# Patient Record
Sex: MALE | Race: BLACK OR AFRICAN AMERICAN | NOT HISPANIC OR LATINO | Employment: FULL TIME | URBAN - METROPOLITAN AREA
[De-identification: names, ages, dates, MRNs, and addresses within clinical notes are randomized per-mention and may not be internally consistent; named-entity substitution may affect disease eponyms.]

---

## 2021-04-14 ENCOUNTER — OFFICE VISIT (OUTPATIENT)
Dept: NURSING | Facility: CLINIC | Age: 46
End: 2021-04-14
Payer: COMMERCIAL

## 2021-04-14 PROCEDURE — 91300 PR COVID VAC PFIZER DIL RECON 30 MCG/0.3 ML IM: CPT

## 2021-04-14 PROCEDURE — 0001A PR COVID VAC PFIZER DIL RECON 30 MCG/0.3 ML IM: CPT

## 2021-05-05 ENCOUNTER — IMMUNIZATION (OUTPATIENT)
Dept: NURSING | Facility: CLINIC | Age: 46
End: 2021-05-05
Attending: INTERNAL MEDICINE
Payer: COMMERCIAL

## 2021-05-05 PROCEDURE — 91300 PR COVID VAC PFIZER DIL RECON 30 MCG/0.3 ML IM: CPT

## 2021-05-05 PROCEDURE — 0002A PR COVID VAC PFIZER DIL RECON 30 MCG/0.3 ML IM: CPT

## 2021-05-30 ENCOUNTER — HEALTH MAINTENANCE LETTER (OUTPATIENT)
Age: 46
End: 2021-05-30

## 2021-09-19 ENCOUNTER — HEALTH MAINTENANCE LETTER (OUTPATIENT)
Age: 46
End: 2021-09-19

## 2022-06-26 ENCOUNTER — HEALTH MAINTENANCE LETTER (OUTPATIENT)
Age: 47
End: 2022-06-26

## 2022-11-21 ENCOUNTER — HEALTH MAINTENANCE LETTER (OUTPATIENT)
Age: 47
End: 2022-11-21

## 2023-07-09 ENCOUNTER — HEALTH MAINTENANCE LETTER (OUTPATIENT)
Age: 48
End: 2023-07-09

## 2024-03-07 ENCOUNTER — OFFICE VISIT (OUTPATIENT)
Dept: FAMILY MEDICINE | Facility: CLINIC | Age: 49
End: 2024-03-07
Payer: COMMERCIAL

## 2024-03-07 VITALS
BODY MASS INDEX: 31.34 KG/M2 | DIASTOLIC BLOOD PRESSURE: 80 MMHG | TEMPERATURE: 98.2 F | HEIGHT: 66 IN | RESPIRATION RATE: 18 BRPM | WEIGHT: 195 LBS | SYSTOLIC BLOOD PRESSURE: 138 MMHG | HEART RATE: 96 BPM | OXYGEN SATURATION: 97 %

## 2024-03-07 DIAGNOSIS — Z23 NEED FOR IMMUNIZATION AGAINST TYPHOID: ICD-10-CM

## 2024-03-07 DIAGNOSIS — Z23 NEED FOR HEPATITIS A IMMUNIZATION: ICD-10-CM

## 2024-03-07 DIAGNOSIS — Z71.84 ENCOUNTER FOR COUNSELING FOR TRAVEL: Primary | ICD-10-CM

## 2024-03-07 PROCEDURE — 90472 IMMUNIZATION ADMIN EACH ADD: CPT | Mod: GA | Performed by: PHYSICIAN ASSISTANT

## 2024-03-07 PROCEDURE — 99402 PREV MED CNSL INDIV APPRX 30: CPT | Mod: 25 | Performed by: PHYSICIAN ASSISTANT

## 2024-03-07 PROCEDURE — 90471 IMMUNIZATION ADMIN: CPT | Mod: GA | Performed by: PHYSICIAN ASSISTANT

## 2024-03-07 PROCEDURE — 90632 HEPA VACCINE ADULT IM: CPT | Mod: GA | Performed by: PHYSICIAN ASSISTANT

## 2024-03-07 PROCEDURE — 90691 TYPHOID VACCINE IM: CPT | Mod: GA | Performed by: PHYSICIAN ASSISTANT

## 2024-03-07 RX ORDER — ATOVAQUONE AND PROGUANIL HYDROCHLORIDE 250; 100 MG/1; MG/1
1 TABLET, FILM COATED ORAL DAILY
Qty: 30 TABLET | Refills: 0 | Status: SHIPPED | OUTPATIENT
Start: 2024-03-07

## 2024-03-07 RX ORDER — AMLODIPINE BESYLATE 10 MG/1
10 TABLET ORAL DAILY
COMMUNITY
Start: 2023-01-16 | End: 2024-03-07

## 2024-03-07 RX ORDER — AZITHROMYCIN 500 MG/1
TABLET, FILM COATED ORAL
Qty: 3 TABLET | Refills: 0 | Status: SHIPPED | OUTPATIENT
Start: 2024-03-07

## 2024-03-07 RX ORDER — LOPERAMIDE HYDROCHLORIDE 2 MG/1
2 TABLET ORAL 4 TIMES DAILY PRN
Qty: 40 TABLET | Refills: 0 | Status: SHIPPED | OUTPATIENT
Start: 2024-03-07

## 2024-03-07 RX ORDER — AMLODIPINE BESYLATE 10 MG/1
10 TABLET ORAL DAILY
Qty: 90 TABLET | Refills: 1 | Status: SHIPPED | OUTPATIENT
Start: 2024-03-07

## 2024-03-07 NOTE — PROGRESS NOTES
SUBJECTIVE: Carlton Mccord , a 48 year old  male, presents for counseling and information regarding upcoming travel to [facility]. Special medical concerns include: none. He anticipates the following unusual exposures: none.    Itinerary: [facility]    Departure Date: 3/13/2024 Return date: 4/2/2024    Reason for travel (i.e. Business, pleasure): family emergency    Visiting an urban or rural area?: urban    Accommodations (i.e. hotel, hostel, friends, family, etc): family    Women - First day of your last period: NA    IMMUNIZATION HISTORY  Have you received any vaccinations in the past 4 weeks?  No  Have you ever fainted from having your blood drawn or from an injection?  No  Have you ever had a fever reaction to vaccination?  No  Have you ever had any bad reaction or side effect from any vaccination?  No  Have you ever had hepatitis A or B vaccine?  No  Do you live (or work closely) with anyone who has AIDS, an AIDS-like condition, any other immune disorder or who is on chemotherapy for cancer?  No  Have you received any injection of immune globulin or any blood products during the past 12 months?  No    GENERAL MEDICAL HISTORY  Do you have a medical condition that warrants maintenance medication or physician follow-up?  No  Do you have a medical condition that is stable now, but that may recur while traveling?  No  Has your spleen been removed?  No  Have you had an acute illness or a fever in the past 48 hours?  No  Are you pregnant, or might you become pregnant on this trip?  Any chance of pregnancy?  No  Are you breastfeeding?  No  Do you have HIV, AIDS, an AIDS-like condition, any other immune disorder, leukemia or cancer?  No  Do you have a severe combined immunodeficiency disease?  No  Have you had your thymus gland removed or history of problems with your thymus, such as myasthenia gravis, DiGeorge syndrome, or thymoma?  No    Do you have severe thrombocytopenia (low platelet count) or a coagulation  disorder?  No  Have you ever had a convulsion, seizure, epilepsy, neurologic condition or brain infection?  No  Do you have any stomach conditions?  No  Do you have a G6PD deficiency?  No  Do you have severe renal or kidney impairment?  No  Do you have a history of psychiatric problems?  No  Do you have a problem with strange dreams and/or nightmares?  No  Do you have insomnia?  No  Do you have problems with vaginitis?  No  Do you have psoriasis?  No  Are you prone to motion sickness?  No  Have you ever had headaches, nausea, vomiting, or breathing problems from altitude exposure?  No      No past medical history on file.   Immunization History   Administered Date(s) Administered    COVID-19 Bivalent 12+ (Pfizer) 10/03/2022    COVID-19 MONOVALENT 12+ (Pfizer) 04/14/2021, 05/05/2021       No current outpatient medications on file.     Not on File     EXAM: deferred    Immunizations discussed include: Hepatitis A and Typhoid (typhoid still valid but he may return again as his mother is ill- he elected to do it today)  Malaria prophylaxis recommended: Malarone  Symptomatic treatment for traveler's diarrhea: bismuth subsalicylate, loperamide/diphenoxylate, and azithromycin    ASSESSMENT/PLAN:    (Z71.84) Encounter for counseling for travel  (primary encounter diagnosis)    Comment: Hepatitis A and typhoid vaccines today. Patient will return or follow-up with PCP as needed. Prophylaxis given for Traveler's diarrhea and Malaria. All questions were answered.     Plan: amLODIPine (NORVASC) 10 MG tablet,         atovaquone-proguanil (MALARONE) 250-100 MG         tablet, loperamide (IMODIUM A-D) 2 MG tablet,         azithromycin (ZITHROMAX) 500 MG tablet            (Z23) Need for hepatitis A immunization  Comment:   Plan: HEPATITIS A 19+ (HAVRIX/VAQTA)            (Z23) Need for immunization against typhoid  Comment:   Plan: TYPHOID VACCINE, IM              I have reviewed general recommendations for safe travel   including:  food/water precautions, insect avoidance, safe sex   practices given high prevalence of HIV and other STDs,   roadway safety. Educational materials and links to the CDC   Traveler's health website have been provided.    Total time 23 minutes, greater than 50 percent in counseling   and coordination of care.      I can refill medications within 1 year of this visit if needed.

## 2024-03-07 NOTE — PATIENT INSTRUCTIONS
"See travel packet provided  Recommend ultrathon (mosquito repellant), pepto bismol and imodium  The food and drink choices you make while traveling can impact your likelihood of getting sick.   If you aren't sure if a food or drink is safe, the saying \" BOIL IT, COOK IT, PEEL IT, OR FORGET IT\" can help you decide whether it's okay to consume.   Also bring hand  and sun screen with you.  Safe Travels     If you first start to get mild to moderate diarrhea, take imodium.      If diarrhea is severe or you have a fever with the diarrhea, take the antibiotic (azithromycin).      Today March 7, 2024 you received the    Hepatitis A Vaccine - Please return on 9/7/24 or later for your 2nd and final dose.    Typhoid - injectable. This vaccine is valid for two years. .    These appointments can be made as a NURSE ONLY visit.    **It is very important for the vaccinations to be given on the scheduled day(s), this helps ensure you receive the full effectiveness of the vaccine.**    Please call [facility] with any questions [phone]    Thank you for visiting [facility]    "

## 2024-06-11 ENCOUNTER — MYC REFILL (OUTPATIENT)
Dept: FAMILY MEDICINE | Facility: CLINIC | Age: 49
End: 2024-06-11
Payer: COMMERCIAL

## 2024-06-11 DIAGNOSIS — Z71.84 ENCOUNTER FOR COUNSELING FOR TRAVEL: ICD-10-CM

## 2024-06-11 RX ORDER — AMLODIPINE BESYLATE 10 MG/1
10 TABLET ORAL DAILY
Qty: 90 TABLET | Refills: 1 | OUTPATIENT
Start: 2024-06-11

## 2024-06-12 RX ORDER — AMLODIPINE BESYLATE 10 MG/1
10 TABLET ORAL DAILY
Qty: 90 TABLET | Refills: 1 | OUTPATIENT
Start: 2024-06-12

## 2024-08-13 DIAGNOSIS — Z71.84 ENCOUNTER FOR COUNSELING FOR TRAVEL: ICD-10-CM

## 2024-08-14 RX ORDER — AMLODIPINE BESYLATE 10 MG/1
10 TABLET ORAL DAILY
Qty: 90 TABLET | Refills: 1 | OUTPATIENT
Start: 2024-08-14

## 2024-08-15 NOTE — TELEPHONE ENCOUNTER
Called pt-informed him of message-he will research providers at [place] and then call to schedule.    Catherine Ray/ZAIN    
I only saw patient for travel so they will need to request refills from their primary care provider.    Iker Salvador PA-C on 8/14/2024 at 9:10 AM    
avinash all pertinent systems normal

## 2024-09-01 ENCOUNTER — HEALTH MAINTENANCE LETTER (OUTPATIENT)
Age: 49
End: 2024-09-01

## 2024-10-03 DIAGNOSIS — Z71.84 ENCOUNTER FOR COUNSELING FOR TRAVEL: ICD-10-CM

## 2024-10-03 RX ORDER — AMLODIPINE BESYLATE 10 MG/1
10 TABLET ORAL DAILY
Qty: 90 TABLET | Refills: 1 | OUTPATIENT
Start: 2024-10-03

## 2024-10-03 NOTE — TELEPHONE ENCOUNTER
Please call patient. I only refilled this once during a travel visit as a courtesy. He will need to follow-up with his primary care provider for refills.    Iker Salvador PA-C on 10/3/2024 at 11:01 AM

## 2024-11-01 ENCOUNTER — OFFICE VISIT (OUTPATIENT)
Dept: PEDIATRICS | Facility: CLINIC | Age: 49
End: 2024-11-01
Payer: COMMERCIAL

## 2024-11-01 VITALS
WEIGHT: 197.1 LBS | TEMPERATURE: 97 F | HEIGHT: 65 IN | RESPIRATION RATE: 16 BRPM | BODY MASS INDEX: 32.84 KG/M2 | OXYGEN SATURATION: 96 % | HEART RATE: 81 BPM | SYSTOLIC BLOOD PRESSURE: 132 MMHG | DIASTOLIC BLOOD PRESSURE: 86 MMHG

## 2024-11-01 DIAGNOSIS — I10 BENIGN ESSENTIAL HYPERTENSION: ICD-10-CM

## 2024-11-01 DIAGNOSIS — E66.811 CLASS 1 OBESITY WITH SERIOUS COMORBIDITY AND BODY MASS INDEX (BMI) OF 32.0 TO 32.9 IN ADULT, UNSPECIFIED OBESITY TYPE: ICD-10-CM

## 2024-11-01 DIAGNOSIS — Z12.11 SCREEN FOR COLON CANCER: ICD-10-CM

## 2024-11-01 DIAGNOSIS — Z00.00 ENCOUNTER FOR PREVENTATIVE ADULT HEALTH CARE EXAMINATION: Primary | ICD-10-CM

## 2024-11-01 PROCEDURE — 99213 OFFICE O/P EST LOW 20 MIN: CPT | Mod: 25 | Performed by: NURSE PRACTITIONER

## 2024-11-01 PROCEDURE — 99396 PREV VISIT EST AGE 40-64: CPT | Performed by: NURSE PRACTITIONER

## 2024-11-01 RX ORDER — AMLODIPINE BESYLATE 10 MG/1
10 TABLET ORAL DAILY
Qty: 90 TABLET | Refills: 3 | Status: SHIPPED | OUTPATIENT
Start: 2024-11-01

## 2024-11-01 SDOH — HEALTH STABILITY: PHYSICAL HEALTH: ON AVERAGE, HOW MANY DAYS PER WEEK DO YOU ENGAGE IN MODERATE TO STRENUOUS EXERCISE (LIKE A BRISK WALK)?: 6 DAYS

## 2024-11-01 SDOH — HEALTH STABILITY: PHYSICAL HEALTH: ON AVERAGE, HOW MANY MINUTES DO YOU ENGAGE IN EXERCISE AT THIS LEVEL?: 30 MIN

## 2024-11-01 ASSESSMENT — SOCIAL DETERMINANTS OF HEALTH (SDOH): HOW OFTEN DO YOU GET TOGETHER WITH FRIENDS OR RELATIVES?: MORE THAN THREE TIMES A WEEK

## 2024-11-01 ASSESSMENT — PAIN SCALES - GENERAL: PAINLEVEL_OUTOF10: NO PAIN (0)

## 2024-11-01 NOTE — PROGRESS NOTES
"Preventive Care Visit  [facility] TYSHAWN Montero CNP, Family Medicine  Nov 1, 2024      Assessment & Plan     Encounter for preventative adult health care examination  Age appropriate screening and preventative care have been addressed today. Vaccinations have been reviewed and discussed. Reports he has had flu, covid this season and has been previously vaccinated against hep B. Patient has been advised to follow a balanced diet with reduction in cholesterol, and reasonable portion sizes. They have been advised to undertake routine aerobic activity. Recommend annual vision exams as well as biannual dental exams. They will follow up for annual physical again in one year.   - Lipid panel reflex to direct LDL Fasting; Future    Screen for colon cancer  - Colonoscopy Screening  Referral; Future    Class 1 obesity with serious comorbidity and body mass index (BMI) of 32.0 to 32.9 in adult, unspecified obesity type  Body mass index is 32.64 kg/m . Has increased physical activity over the past year, encouraged to continue with this.     Benign essential hypertension  BP slightly elevated today. Takes amlodipine in the PM and took as prescribed last night. Rx for home BP cuff given, he will send me readings in about 2 weeks. Will consider addition of second agent based on those results.   - Comprehensive metabolic panel (BMP + Alb, Alk Phos, ALT, AST, Total. Bili, TP); Future  - Home Blood Pressure Monitor Order for DME - ONLY FOR DME          BMI  Estimated body mass index is 32.64 kg/m  as calculated from the following:    Height as of this encounter: 1.655 m (5' 5.16\").    Weight as of this encounter: 89.4 kg (197 lb 1.6 oz).   Weight management plan: Discussed healthy diet and exercise guidelines    Counseling  Appropriate preventive services were addressed with this patient via screening, questionnaire, or discussion as appropriate for fall prevention, nutrition, physical activity, " Tobacco-use cessation, social engagement, weight loss and cognition.  Checklist reviewing preventive services available has been given to the patient.  Reviewed patient's diet, addressing concerns and/or questions.         Solange ACGLE is a 49 year old, presenting for the following:  Physical        11/1/2024     7:21 AM   Additional Questions   Roomed by Pennie Fleming   Accompanied by DAVID RUBI          11/1/2024   General Health   How would you rate your overall physical health? Good   Feel stress (tense, anxious, or unable to sleep) Not at all            11/1/2024   Nutrition   Three or more servings of calcium each day? (!) I DON'T KNOW   Diet: Regular (no restrictions)   How many servings of fruit and vegetables per day? (!) 0-1   How many sweetened beverages each day? 0-1            11/1/2024   Exercise   Days per week of moderate/strenous exercise 6 days   Average minutes spent exercising at this level 30 min            11/1/2024   Social Factors   Frequency of gathering with friends or relatives More than three times a week   Worry food won't last until get money to buy more No   Food not last or not have enough money for food? No   Do you have housing? (Housing is defined as stable permanent housing and does not include staying ouside in a car, in a tent, in an abandoned building, in an overnight shelter, or couch-surfing.) Yes   Are you worried about losing your housing? No   Lack of transportation? No   Unable to get utilities (heat,electricity)? No            11/1/2024   Dental   Dentist two times every year? Yes            11/1/2024   TB Screening   Were you born outside of the [place]? Yes              Today's PHQ-2 Score:       3/7/2024     7:24 AM   PHQ-2 ( 1999 Pfizer)   Q1: Little interest or pleasure in doing things 0   Q2: Feeling down, depressed or hopeless 0   PHQ-2 Score 0         11/1/2024   Substance Use   Alcohol more than 3/day or more than 7/wk No   Do you use any other  "substances recreationally? No        Social History     Tobacco Use    Smoking status: Never    Smokeless tobacco: Never   Vaping Use    Vaping status: Never Used   Substance Use Topics    Alcohol use: Never    Drug use: Never             11/1/2024   One time HIV Screening   Previous HIV test? No          11/1/2024   STI Screening   New sexual partner(s) since last STI/HIV test? No      ASCVD Risk   The ASCVD Risk score (Peyton BARAJAS, et al., 2019) failed to calculate for the following reasons:    Cannot find a previous HDL lab    Cannot find a previous total cholesterol lab        11/1/2024   Contraception/Family Planning   Questions about contraception or family planning No           Reviewed and updated as needed this visit by Provider                    There is no problem list on file for this patient.    No past surgical history on file.    Social History     Tobacco Use    Smoking status: Never    Smokeless tobacco: Never   Substance Use Topics    Alcohol use: Never     No family history on file.             Objective    Exam  /86 (BP Location: Right arm, Patient Position: Sitting, Cuff Size: Adult Large)   Pulse 81   Temp 97  F (36.1  C) (Temporal)   Resp 16   Ht 1.655 m (5' 5.16\")   Wt 89.4 kg (197 lb 1.6 oz)   SpO2 96%   BMI 32.64 kg/m     Estimated body mass index is 32.64 kg/m  as calculated from the following:    Height as of this encounter: 1.655 m (5' 5.16\").    Weight as of this encounter: 89.4 kg (197 lb 1.6 oz).    Physical Exam  Constitutional: appears to be in no acute distress, comfortable, pleasant.   Eyes: anicteric, conjunctiva clear without drainage or erythema. OVI.   Ears, Nose and Throat: tympanic membranes gray with LR,  nose without nasal discharge. OP: no erythema to posterior pharynx, negative post nasal drainage, tonsils +1 no erythema or exudate.  Neck: supple, thyroid palpable,not enlarged, no nodules   Cardiovascular: regular rate and rhythm, normal S1 and S2, " no murmurs, rubs or gallops, peripheral pulses full and symmetric; negative peripheral edema   Respiratory: Air entry throughout. Breathing pattern unlabored without the use of accessory muscles. Clear to auscultation A and P, no wheezes or crackles, normal breath sounds.    Musculoskeletal: full range of motion, no edema.   Skin: pink, turgor smooth and elastic. Negative for lesions or dryness.  Neurological: normal gait, no tremor.   Psychological: appropriate mood and affect.   Lymphatic: no cervical, axillary, supraclavicular, or infraclavicular lymphadenopathy.          Signed Electronically by: TYSHAWN Campbell CNP

## 2024-11-01 NOTE — PATIENT INSTRUCTIONS
Please get a blood pressure cuff and check your blood pressure 3x/week. After 2 weeks, send me blood pressure readings in a [employer] message.